# Patient Record
Sex: MALE | Race: WHITE | NOT HISPANIC OR LATINO | URBAN - METROPOLITAN AREA
[De-identification: names, ages, dates, MRNs, and addresses within clinical notes are randomized per-mention and may not be internally consistent; named-entity substitution may affect disease eponyms.]

---

## 2020-09-03 NOTE — PATIENT DISCUSSION
LAGOPHTHALMOS OD - MILD.  RE-EVALUATE AT FU FOR FULL LID CLOSURE TO ENSURE PROPER LASIK HEALING. 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

## 2020-09-03 NOTE — PATIENT DISCUSSION
BLEPHARITIS OU - SEVERE. EDU ON FINDINGS &amp; UNDERSTANDS HE IS NOT A LASIK CANDIDATE UNTIL LID HYGIENE IS CONTROLLED. RX OCUSOFT/SYSTANE WIPES BID OU. MONITOR X 2 WEEKS. IF PERSISTENT, MAY CONSIDER DOXY DOSE.

## 2020-09-03 NOTE — PATIENT DISCUSSION
MYOPIA/ASTIGMATISM, OU -DISC OPT OF REFRACTIVE [id number]-VS-[id number]. PER TOPOS &amp; RX, LASIK CANDIDATE OU.

## 2020-09-17 NOTE — PATIENT DISCUSSION
BLEPHARITIS OU/LAGOPHTHALMOS OD&gt;OS - PT UNDERSTANDS HE IS NOT A CANDIDATE AT THIS TIME 2' UNABLE TO FULLY CLOSE LIDS OD&gt;OS WHICH WILL MAKE HEALING RATE UNPREDICTABLE. [address] [facility].

## 2022-02-11 ENCOUNTER — NEW PATIENT (OUTPATIENT)
Dept: URBAN - METROPOLITAN AREA CLINIC 6 | Facility: CLINIC | Age: 67
End: 2022-02-11

## 2022-02-11 DIAGNOSIS — H25.13: ICD-10-CM

## 2022-02-11 DIAGNOSIS — H43.813: ICD-10-CM

## 2022-02-11 PROCEDURE — 92004 COMPRE OPH EXAM NEW PT 1/>: CPT

## 2022-02-11 ASSESSMENT — VISUAL ACUITY
OS_CC: 20/20-1
OU_CC: J1+
OD_CC: 20/40+1

## 2022-02-11 ASSESSMENT — TONOMETRY
OD_IOP_MMHG: 11
OS_IOP_MMHG: 12

## 2022-02-11 NOTE — PATIENT DISCUSSION
Discussed findings, not visually significant at this time. Continue w/ current glasses Rx. Call with any changes to vision. Monitor.

## 2022-07-04 RX ORDER — PRAVASTATIN SODIUM 80 MG/1
TABLET ORAL
COMMUNITY

## 2022-07-04 RX ORDER — TADALAFIL 20 MG/1
TABLET ORAL
COMMUNITY

## 2022-07-04 RX ORDER — COLESEVELAM 180 1/1
TABLET ORAL
COMMUNITY

## 2022-11-04 ENCOUNTER — NEW PATIENT (OUTPATIENT)
Dept: URBAN - METROPOLITAN AREA CLINIC 9 | Facility: CLINIC | Age: 67
End: 2022-11-04

## 2022-11-04 DIAGNOSIS — H52.223: ICD-10-CM

## 2022-11-04 DIAGNOSIS — R51.9: ICD-10-CM

## 2022-11-04 DIAGNOSIS — H04.123: ICD-10-CM

## 2022-11-04 DIAGNOSIS — H25.13: ICD-10-CM

## 2022-11-04 PROCEDURE — 92015 DETERMINE REFRACTIVE STATE: CPT

## 2022-11-04 PROCEDURE — 92004 COMPRE OPH EXAM NEW PT 1/>: CPT

## 2022-11-04 ASSESSMENT — TONOMETRY
OD_IOP_MMHG: 13
OS_IOP_MMHG: 12

## 2022-11-04 ASSESSMENT — VISUAL ACUITY
OD_PH: 20/30
OD_CC: 20/40
OS_CC: 20/25

## 2023-11-09 ENCOUNTER — ESTABLISHED PATIENT (OUTPATIENT)
Facility: LOCATION | Age: 68
End: 2023-11-09

## 2023-11-09 DIAGNOSIS — H04.123: ICD-10-CM

## 2023-11-09 DIAGNOSIS — H52.223: ICD-10-CM

## 2023-11-09 DIAGNOSIS — H25.13: ICD-10-CM

## 2023-11-09 DIAGNOSIS — H43.813: ICD-10-CM

## 2023-11-09 PROCEDURE — 92015 DETERMINE REFRACTIVE STATE: CPT

## 2023-11-09 PROCEDURE — 92014 COMPRE OPH EXAM EST PT 1/>: CPT

## 2023-11-09 ASSESSMENT — TONOMETRY
OS_IOP_MMHG: 15
OD_IOP_MMHG: 14

## 2023-11-09 ASSESSMENT — KERATOMETRY
OS_AXISANGLE_DEGREES: 0
OS_AXISANGLE2_DEGREES: 90
OD_K1POWER_DIOPTERS: 43.00
OD_AXISANGLE_DEGREES: 104
OD_AXISANGLE2_DEGREES: 14
OD_K2POWER_DIOPTERS: 44.75
OS_K2POWER_DIOPTERS: 42.50
OS_K1POWER_DIOPTERS: 42.50

## 2023-11-09 ASSESSMENT — VISUAL ACUITY
OD_CC: 20/40-1
OS_CC: 20/20-1
OD_BCVA: 20/40
OS_BCVA: 20/20-2